# Patient Record
Sex: FEMALE | Race: WHITE
[De-identification: names, ages, dates, MRNs, and addresses within clinical notes are randomized per-mention and may not be internally consistent; named-entity substitution may affect disease eponyms.]

---

## 2019-01-27 NOTE — PDOC.LDHP
Labor and Delivery H&P


Chief complaint: scheduled induction


HPI: 





28 Y/0  AT 37 AND 2/7 PRESENTS TODAY FOR MEDICAL TERM INDUCTION OF 

LABOR FOR GHTN, PREVIOUS HX OF FETAL DEMISE FOR CARDIAC RELATED DEFORMITY. 

FETAL ECHO THIS PREGNANCY APPEARS TO BE NORMAL. PATIENT HAS HAD WEEKLY BPP 

TESTING IN-OFFICE. NO BP MEDICATIONS HAVE BEEN USED.


Current gestational age (weeks): 37


Current pregnancy complications: gestational hypertension


Abnormal US findings: No


Current medications: pre-angelique vitamins


Social history: none





- Physical Exam


Vital signs reviewed and normal: yes


General: NAD


Heart: RRR


Lungs: nonlabored breathing


Abdomen: NTTP


Extremeties: no edema





- Assessment


L&D Assessment: medically indicated induction





- Plan


Plan: admit to L&D

## 2019-01-28 ENCOUNTER — HOSPITAL ENCOUNTER (INPATIENT)
Dept: HOSPITAL 92 - L&D | Age: 29
LOS: 2 days | Discharge: HOME | End: 2019-01-30
Attending: OBSTETRICS & GYNECOLOGY | Admitting: OBSTETRICS & GYNECOLOGY
Payer: COMMERCIAL

## 2019-01-28 VITALS — BODY MASS INDEX: 29.3 KG/M2

## 2019-01-28 DIAGNOSIS — Z87.59: ICD-10-CM

## 2019-01-28 DIAGNOSIS — Z3A.37: ICD-10-CM

## 2019-01-28 LAB
HGB BLD-MCNC: 12.6 G/DL (ref 12–16)
MCH RBC QN AUTO: 30.1 PG (ref 27–31)
MCV RBC AUTO: 89.3 FL (ref 78–98)
PLATELET # BLD AUTO: 160 THOU/UL (ref 130–400)
RBC # BLD AUTO: 4.2 MILL/UL (ref 4.2–5.4)
SYPHILIS ANTIBODY INDEX: 0.03 S/CO
WBC # BLD AUTO: 8 THOU/UL (ref 4.8–10.8)

## 2019-01-28 PROCEDURE — 86780 TREPONEMA PALLIDUM: CPT

## 2019-01-28 PROCEDURE — 85027 COMPLETE CBC AUTOMATED: CPT

## 2019-01-28 PROCEDURE — 36415 COLL VENOUS BLD VENIPUNCTURE: CPT

## 2019-01-28 PROCEDURE — 86850 RBC ANTIBODY SCREEN: CPT

## 2019-01-28 PROCEDURE — 86900 BLOOD TYPING SEROLOGIC ABO: CPT

## 2019-01-28 PROCEDURE — 87340 HEPATITIS B SURFACE AG IA: CPT

## 2019-01-28 PROCEDURE — 86901 BLOOD TYPING SEROLOGIC RH(D): CPT

## 2019-01-28 RX ADMIN — SODIUM CHLORIDE SCH: 0.9 INJECTION, SOLUTION INTRAVENOUS at 08:27

## 2019-01-28 RX ADMIN — SODIUM CHLORIDE SCH MLS: 0.9 INJECTION, SOLUTION INTRAVENOUS at 08:15

## 2019-01-29 LAB — HBSAG INDEX: 0.22 S/CO (ref 0–0.99)

## 2019-01-29 PROCEDURE — 10907ZC DRAINAGE OF AMNIOTIC FLUID, THERAPEUTIC FROM PRODUCTS OF CONCEPTION, VIA NATURAL OR ARTIFICIAL OPENING: ICD-10-PCS | Performed by: OBSTETRICS & GYNECOLOGY

## 2019-01-29 RX ADMIN — SODIUM CHLORIDE SCH MLS: 0.9 INJECTION, SOLUTION INTRAVENOUS at 06:53

## 2019-01-30 VITALS — SYSTOLIC BLOOD PRESSURE: 142 MMHG | DIASTOLIC BLOOD PRESSURE: 94 MMHG | TEMPERATURE: 98.1 F

## 2019-01-30 LAB
HGB BLD-MCNC: 11.9 G/DL (ref 12–16)
MCH RBC QN AUTO: 30.2 PG (ref 27–31)
MCV RBC AUTO: 89.5 FL (ref 78–98)
PLATELET # BLD AUTO: 126 THOU/UL (ref 130–400)
RBC # BLD AUTO: 3.95 MILL/UL (ref 4.2–5.4)
WBC # BLD AUTO: 10.4 THOU/UL (ref 4.8–10.8)

## 2019-01-30 RX ADMIN — DOCUSATE CALCIUM SCH: 240 CAPSULE, LIQUID FILLED ORAL at 09:00

## 2019-01-30 RX ADMIN — DOCUSATE CALCIUM SCH: 240 CAPSULE, LIQUID FILLED ORAL at 11:58

## 2019-01-30 NOTE — PDOC.LDPN
Labor & Delivery Progress Note





- Subjective


Subjective: comfortable





- Objective


Vital signs reviewed and normal: yes


General: NAD, resting


Uterine fundus: non tender


Dilation: 4


Effacement: 75%


Station: -2


FHT: category 1 (Patient is doing well. Pitocin currently off. Will restart in 

the morning if cervical change is not achieved. BP is stable.)

## 2019-01-30 NOTE — PDOC.PP
Post Partum Progress Note


Post Partum Day #: 1


PO intake tolerated: yes


Flatus: yes


Ambulation: yes


 Vital Signs (12 hours)











  Temp Pulse Resp BP Pulse Ox


 


 01/30/19 11:39  98.1 F  70  20  142/94 H 


 


 01/30/19 08:19  98.5 F  92  20  136/65  97


 


 01/30/19 08:15      97








 Weight











Weight                         179 lb

















- Physical Examination


General: NAD


Cardiovascular: no m/r/g, RRR


Respiratory: clear to auscultation bilaterally, non-labored breathing


Abdominal: + bowel sounds, lochia


Extremities: negative homans (B)


Neurological: no gross focal deficits


Psychiatric: A&Ox3, normal affect (DC to home. F/U in clinic in 2 weeks.)


Result Diagrams: 


 01/30/19 06:19





Additional Labs: 


 Post Partum Labs











Blood Type  A POSITIVE   01/28/19  08:01    


 


Hep Bs Antigen  Non-Reactive S/CO (NonReactive)   01/28/19  08:01

## 2019-01-31 NOTE — DN
DATE OF PROCEDURE:  2019



This lady for the majority of her pregnancy went under a different name including

her insurance which is under last name [facility] for much of the pregnancy. 



PREOPERATIVE DIAGNOSIS:  Intrauterine pregnancy at 37 weeks with a history of

chronic hypertension as well as a history of a stillbirth, premature delivery with

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 



POSTOPERATIVE DIAGNOSIS:  Intrauterine pregnancy at 37 weeks with a history of

chronic hypertension as well as a history of a stillbirth, premature delivery with

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 



PROCEDURE PERFORMED:  Spontaneous vaginal delivery over intact perineum without

epidural. 



FINDINGS:  Viable female infant, weighing 2769 g or 6 pounds 2 ounces with Apgars 9

and 9. 



QUANTITATIVE BLOOD LOSS:  203 mL.



COMPLICATIONS:  None.



PROCEDURE IN DETAIL:  The patient presented to [facility]

where she was admitted to the labor and delivery service.  The patient underwent a

normal and uneventful labor with normal cervical dilatation until she was found to

be completely dilated.  She was then allowed to push and was able to bring the baby

down and delivered the baby in a vertex presentation without difficulties.  Once the

head delivered in occiput anterior position, the shoulders followed spontaneously

along with the rest of the baby's body.  Once out the baby's mouth and nose were

bulb suctioned.  The cord was clamped and cut and baby was handed to waiting

attendants.  Cord blood was collected.  Gentle fundal massage was performed and the

placenta delivered intact 

without problems.  Hemostasis was assured.  Quantitative blood loss was calculated.

Inspection of the cervix, vaginal vault, and perineum did not reveal any lacerations 

needing suturing.  Once again, hemostasis was within normal limits and the patient

was allowed to recover in the labor and delivery room.  Baby went to 

nursery. 







Job ID:  [id number]

## 2020-09-01 ENCOUNTER — HOSPITAL ENCOUNTER (INPATIENT)
Dept: HOSPITAL 92 - L&D | Age: 30
LOS: 2 days | Discharge: HOME | End: 2020-09-03
Attending: OBSTETRICS & GYNECOLOGY | Admitting: OBSTETRICS & GYNECOLOGY
Payer: COMMERCIAL

## 2020-09-01 VITALS — BODY MASS INDEX: 27.4 KG/M2

## 2020-09-01 DIAGNOSIS — Z3A.38: ICD-10-CM

## 2020-09-01 DIAGNOSIS — Z20.828: ICD-10-CM

## 2020-09-01 LAB
HBSAG INDEX: 0.21 S/CO (ref 0–0.99)
HGB BLD-MCNC: 11.5 G/DL (ref 12–16)
MCH RBC QN AUTO: 29.9 PG (ref 27–31)
MCV RBC AUTO: 85.3 FL (ref 78–98)
PLATELET # BLD AUTO: 181 THOU/UL (ref 130–400)
RBC # BLD AUTO: 3.85 MILL/UL (ref 4.2–5.4)
SYPHILIS ANTIBODY INDEX: 0.03 S/CO
WBC # BLD AUTO: 9.9 THOU/UL (ref 4.8–10.8)

## 2020-09-01 PROCEDURE — U0003 INFECTIOUS AGENT DETECTION BY NUCLEIC ACID (DNA OR RNA); SEVERE ACUTE RESPIRATORY SYNDROME CORONAVIRUS 2 (SARS-COV-2) (CORONAVIRUS DISEASE [COVID-19]), AMPLIFIED PROBE TECHNIQUE, MAKING USE OF HIGH THROUGHPUT TECHNOLOGIES AS DESCRIBED BY CMS-2020-01-R: HCPCS

## 2020-09-01 PROCEDURE — 86901 BLOOD TYPING SEROLOGIC RH(D): CPT

## 2020-09-01 PROCEDURE — 87340 HEPATITIS B SURFACE AG IA: CPT

## 2020-09-01 PROCEDURE — 36415 COLL VENOUS BLD VENIPUNCTURE: CPT

## 2020-09-01 PROCEDURE — 85027 COMPLETE CBC AUTOMATED: CPT

## 2020-09-01 PROCEDURE — 86850 RBC ANTIBODY SCREEN: CPT

## 2020-09-01 PROCEDURE — 86900 BLOOD TYPING SEROLOGIC ABO: CPT

## 2020-09-01 PROCEDURE — 87635 SARS-COV-2 COVID-19 AMP PRB: CPT

## 2020-09-01 PROCEDURE — 0HQ9XZZ REPAIR PERINEUM SKIN, EXTERNAL APPROACH: ICD-10-PCS | Performed by: OBSTETRICS & GYNECOLOGY

## 2020-09-01 PROCEDURE — 86780 TREPONEMA PALLIDUM: CPT

## 2020-09-01 PROCEDURE — 10907ZC DRAINAGE OF AMNIOTIC FLUID, THERAPEUTIC FROM PRODUCTS OF CONCEPTION, VIA NATURAL OR ARTIFICIAL OPENING: ICD-10-PCS | Performed by: OBSTETRICS & GYNECOLOGY

## 2020-09-01 RX ADMIN — Medication PRN MLS: at 23:55

## 2020-09-01 RX ADMIN — Medication SCH MLS: at 18:04

## 2020-09-01 RX ADMIN — Medication PRN MLS: at 22:46

## 2020-09-02 VITALS — TEMPERATURE: 98.2 F

## 2020-09-02 LAB
HGB BLD-MCNC: 11.1 G/DL (ref 12–16)
MCH RBC QN AUTO: 29.4 PG (ref 27–31)
MCV RBC AUTO: 86.2 FL (ref 78–98)
PLATELET # BLD AUTO: 159 THOU/UL (ref 130–400)
RBC # BLD AUTO: 3.78 MILL/UL (ref 4.2–5.4)
WBC # BLD AUTO: 11.4 THOU/UL (ref 4.8–10.8)

## 2020-09-02 RX ADMIN — Medication SCH: at 02:53

## 2020-09-02 RX ADMIN — DOCUSATE CALCIUM SCH: 240 CAPSULE, LIQUID FILLED ORAL at 23:16

## 2020-09-02 RX ADMIN — DOCUSATE CALCIUM SCH: 240 CAPSULE, LIQUID FILLED ORAL at 08:39

## 2020-09-02 NOTE — PDOC.PP
Post Partum Progress Note


Post Partum Day #: 1


PO intake tolerated: yes


Flatus: yes


Ambulation: yes


 Vital Signs (12 hours)











  Temp Pulse Resp BP Pulse Ox


 


 09/02/20 12:05  98.4 F  61  20  138/73 


 


 09/02/20 08:13  98.4 F  60  20  149/71 H  99








 Weight











Weight                         165 lb

















- Physical Examination


General: NAD


Cardiovascular: no m/r/g, RRR


Respiratory: clear to auscultation bilaterally, non-labored breathing


Abdominal: + bowel sounds, lochia, no distention, appropriately TTP


Extremities: negative homans (B)


Neurological: no gross focal deficits


Psychiatric: A&Ox3, normal affect


Result Diagrams: 


 09/02/20 09:20





Additional Labs: 


 Post Partum Labs











Blood Type  A POSITIVE   09/01/20  14:59    


 


Hep Bs Antigen  Non-Reactive S/CO (NonReactive)   09/01/20  14:59

## 2020-09-02 NOTE — DN
DATE OF PROCEDURE:  2020



TIME:  2137 hours [place] Daylight Savings Time.



PREOPERATIVE DIAGNOSIS:  Intrauterine pregnancy at 38 weeks and 2 days with a

spontaneous onset of labor. 



POSTOPERATIVE DIAGNOSIS:  Intrauterine pregnancy at 38 weeks and 2 days with a

spontaneous onset of labor. 



PROCEDURE PERFORMED:  Spontaneous vaginal delivery over first-degree laceration of

the perineum. 



FINDINGS:  Viable male infant weighing 3445 g or 7 pounds 9 ounces, Apgars of 9 and

9. 



QUANTITATIVE BLOOD LOSS:  50 mL.



PROCEDURE IN DETAIL:  The patient presented to [facility]

where she was admitted to the labor and delivery service.  The patient underwent a

normal and uneventful labor with normal cervical dilatation until she was found to

be completely dilated.  She was then allowed to push and was able to bring the baby

down and delivered the baby in a vertex presentation without difficulties.  Once the

head delivered in occiput anterior position, the shoulders followed spontaneously

along with the rest of the baby's body.  Once out the baby's mouth and nose were

bulb suctioned.  The cord was clamped and cut and baby was handed to waiting

attendants.  Cord blood was collected.  Gentle fundal massage was performed and the

placenta delivered intact without problems.  Hemostasis was assured.  Quantitative

blood loss was calculated.  Inspection of the cervix, vaginal vault, and perineum

did not reveal any lacerations needing suturing.  Once again, hemostasis was within

normal limits and the patient was allowed to recover in the labor and delivery room.

 Baby went to  nursery. 







Job ID:  [id number]

## 2020-09-03 VITALS — DIASTOLIC BLOOD PRESSURE: 77 MMHG | SYSTOLIC BLOOD PRESSURE: 134 MMHG

## 2020-09-03 RX ADMIN — DOCUSATE CALCIUM SCH: 240 CAPSULE, LIQUID FILLED ORAL at 08:29

## 2021-12-07 ENCOUNTER — HOSPITAL ENCOUNTER (INPATIENT)
Dept: HOSPITAL 92 - CSHLD | Age: 31
LOS: 1 days | Discharge: HOME | End: 2021-12-08
Attending: OBSTETRICS & GYNECOLOGY | Admitting: OBSTETRICS & GYNECOLOGY
Payer: COMMERCIAL

## 2021-12-07 VITALS — BODY MASS INDEX: 30.2 KG/M2

## 2021-12-07 DIAGNOSIS — Z3A.37: ICD-10-CM

## 2021-12-07 DIAGNOSIS — Z20.822: ICD-10-CM

## 2021-12-07 DIAGNOSIS — Z91.040: ICD-10-CM

## 2021-12-07 DIAGNOSIS — Z98.51: ICD-10-CM

## 2021-12-07 LAB
ALBUMIN SERPL BCG-MCNC: 3.5 G/DL (ref 3.5–5)
ALP SERPL-CCNC: 83 U/L (ref 40–110)
ALT SERPL W P-5'-P-CCNC: 12 U/L (ref 8–55)
ANION GAP SERPL CALC-SCNC: 14 MMOL/L (ref 10–20)
AST SERPL-CCNC: 17 U/L (ref 5–34)
BILIRUB SERPL-MCNC: 0.2 MG/DL (ref 0.2–1.2)
BUN SERPL-MCNC: 8 MG/DL (ref 7–18.7)
CALCIUM SERPL-MCNC: 8.5 MG/DL (ref 7.8–10.44)
CHLORIDE SERPL-SCNC: 107 MMOL/L (ref 98–107)
CO2 SERPL-SCNC: 21 MMOL/L (ref 22–29)
CREAT CL PREDICTED SERPL C-G-VRATE: 171 ML/MIN (ref 70–130)
GLOBULIN SER CALC-MCNC: 2.4 G/DL (ref 2.4–3.5)
GLUCOSE SERPL-MCNC: 113 MG/DL (ref 70–105)
HBSAG INDEX: 0.21 S/CO (ref 0–0.99)
HGB BLD-MCNC: 10.5 G/DL (ref 12–15.5)
MCH RBC QN AUTO: 27 PG (ref 27–33)
MCV RBC AUTO: 83.3 FL (ref 81.6–98.3)
PLATELET # BLD AUTO: 180 10X3/UL (ref 150–450)
POTASSIUM SERPL-SCNC: 3.7 MMOL/L (ref 3.5–5.1)
RBC # BLD AUTO: 3.89 10X6/UL (ref 3.9–5.03)
SODIUM SERPL-SCNC: 138 MMOL/L (ref 136–145)
SYPHILIS ANTIBODY INDEX: 0.05 S/CO
WBC # BLD AUTO: 9.9 10X3/UL (ref 3.5–10.5)

## 2021-12-07 PROCEDURE — 85027 COMPLETE CBC AUTOMATED: CPT

## 2021-12-07 PROCEDURE — 86850 RBC ANTIBODY SCREEN: CPT

## 2021-12-07 PROCEDURE — U0002 COVID-19 LAB TEST NON-CDC: HCPCS

## 2021-12-07 PROCEDURE — 87340 HEPATITIS B SURFACE AG IA: CPT

## 2021-12-07 PROCEDURE — 86901 BLOOD TYPING SEROLOGIC RH(D): CPT

## 2021-12-07 PROCEDURE — 3E033VJ INTRODUCTION OF OTHER HORMONE INTO PERIPHERAL VEIN, PERCUTANEOUS APPROACH: ICD-10-PCS | Performed by: OBSTETRICS & GYNECOLOGY

## 2021-12-07 PROCEDURE — 86900 BLOOD TYPING SEROLOGIC ABO: CPT

## 2021-12-07 PROCEDURE — 86780 TREPONEMA PALLIDUM: CPT

## 2021-12-07 PROCEDURE — 80053 COMPREHEN METABOLIC PANEL: CPT

## 2021-12-07 RX ADMIN — DOCUSATE CALCIUM SCH: 240 CAPSULE, LIQUID FILLED ORAL at 21:00

## 2021-12-08 VITALS — DIASTOLIC BLOOD PRESSURE: 76 MMHG | TEMPERATURE: 99.1 F | SYSTOLIC BLOOD PRESSURE: 144 MMHG

## 2021-12-08 LAB
HGB BLD-MCNC: 9.9 G/DL (ref 12–15.5)
MCH RBC QN AUTO: 26.7 PG (ref 27–33)
MCV RBC AUTO: 82.2 FL (ref 81.6–98.3)
PLATELET # BLD AUTO: 169 10X3/UL (ref 150–450)
RBC # BLD AUTO: 3.71 10X6/UL (ref 3.9–5.03)
WBC # BLD AUTO: 13.6 10X3/UL (ref 3.5–10.5)

## 2021-12-08 RX ADMIN — DOCUSATE CALCIUM SCH: 240 CAPSULE, LIQUID FILLED ORAL at 08:36

## 2023-08-22 ENCOUNTER — HOSPITAL ENCOUNTER (EMERGENCY)
Dept: HOSPITAL 92 - CSHERS | Age: 33
Discharge: HOME | End: 2023-08-22
Payer: COMMERCIAL

## 2023-08-22 DIAGNOSIS — U07.1: Primary | ICD-10-CM

## 2023-08-22 LAB — SARS-COV-2 RNA RESP QL NAA+PROBE: DETECTED

## 2023-08-22 PROCEDURE — 99283 EMERGENCY DEPT VISIT LOW MDM: CPT
